# Patient Record
(demographics unavailable — no encounter records)

---

## 2019-09-23 NOTE — RAD
EXAM: 4 views of the lumbosacral spine



HISTORY: Low back pain radiating down both legs for 3 to 4 years



COMPARISON: None



FINDINGS: 4 views of the lumbosacral spine shows normal height of the vertebral bodies and interverte
bral discs without fracture. There is grade 1 anterolisthesis of L4 and L5. Mild to moderate

posterior facet arthrosis is seen throughout the lumbar spine.  Alignment is unchanged with flexion a
nd extension. There is scoliotic curvature of the spine.



The sacroiliac joints are unremarkable.



IMPRESSION: Degenerative changes of lumbar spine with unchanged alignment with bending



Reported By: Alex White 

Electronically Signed:  9/23/2019 2:58 PM

## 2019-09-23 NOTE — MRI
MRI LUMBAR SPINE WITHOUT CONTRAST:



09/23/2019



HISTORY:

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
culopathy, left greater than right.



COMPARISON:

None.



TECHNIQUE:

Multiplanar, multisequence MR imaging of the lumbar spine is obtained without contrast.



FINDINGS:

Sagittal STIR imaging demonstrates no focal area of osseous marrow edema.



Anterolisthesis of L4 on L5 measures 1 cm.



The conus medullaris terminates at the T12 level.



T12-L1:  Mild bilateral facet hypertrophy.  Mild disc space narrowing and disc desiccation with mild 
disc bulge and mild anterior osteophyte formation.  No significant central canal or neural

foraminal stenosis.



L1-L2:  There is a foraminal and post foraminal annular tear on the left with a small associated left
 post foraminal disc protrusion.  Mild bilateral facet hypertrophy.  No significant central canal

or neural foraminal stenosis.



L2-L3:  Mild bilateral facet hypertrophy.  No significant central canal or neural foraminal stenosis.




L3-L4:  There is bilateral facet hypertrophy.  There is no significant central canal or neural forami
nal stenosis.



L4-L5:  There is prominent bilateral facet hypertrophy.  Moderate to severe central canal stenosis.  
Mild bilateral neural foraminal stenosis.  There is disc space narrowing and disc desiccation

present.



L5-S1:  Bilateral facet hypertrophy.  There is disc space narrowing and disc desiccation with vacuum 
disc formation.  Mild central canal stenosis.  No significant neural foraminal stenosis on either

side.



There is a round T1 hypointense and T2 hyperintense lesion emanating from the upper pole of the right
 kidney, suggesting an incompletely imaged cyst.  Additional T2 hyperintensities within both

kidneys suggest renal cysts.



IMPRESSION:

Multilevel lower lumbar spine degenerative change, as detailed above, most prominent at the L4-L5 lev
el.



Transcribed Date/Time: 9/23/2019 2:43 PM



Reported By: Cam Joseph 

Electronically Signed:  9/24/2019 3:59 PM